# Patient Record
(demographics unavailable — no encounter records)

---

## 2025-01-14 NOTE — HISTORY OF PRESENT ILLNESS
[FreeTextEntry1] : MATIAS QUEZADA is a 70 year old right handed female with a PMH of hypertension, hyperlipidemia, who presents to the office today for neurosurgical follow up due to ICH.   She presented to [facility] on 12/29 with complaints of headache and right monocular blurry vision with history of 5 week episodic coughing prior.  She initially presented to the ED on 12/27 with headaches and was discharged home with NSAIDS, Zyrtec and nasal steroids. On 12/29 her symptoms progressed to right monocular blurry vision with non blood emesis after a coughing fit. Left occipital parenchymal hemorrhage. Left frontal parietal subarachnoid hemorrhage. MRI Brain demonstrated acute parenchymal hemorrhage in the left occipital lobe measuring up to 2.6 cm with mild surrounding vasogenic edema. Thin acute left cerebral convexity subdural hematoma measuring up to 3 mm maximum thickness and  Scattered small volumes acute subarachnoid hemorrhage over the bilateral cerebral convexities. No evidence of acute infarct. No midline shift. NIHSS 0 on arrival. She was placed on Keppra 500 mg every 12 hours for seizure prevention and discontinued on 1/1/25. She was started on 3% NS for goal sodium 135-145. She was transferred to [place] on 12/31 with negative diagnostic angiogram. On Verapamil 20 mg every 8 hours. She was discharged home on Verapamil and recommended for neurosurgical and neurology follow up with Dr. Ibrahima Beckwith. She underwent MRI Brain prior to her appointment today which demonstrated Since prior MRI brain 12/29/2024 continued evolution of 2.8 cm left occipital subacute hematoma without worsening mass effect. Stable adjacent subdural hematoma and interval decrease in subarachnoid hemorrhage. Evaluation for underlying lesion is limited due to intrinsic T1 hyperintensity due to evolving subacute hematoma (see detailed report below).  She notes mild frontal headaches aborted with Tylenol.  Surg Hx:    Meds:  Lexapro, Areds, cholestyramine, synthroid 88 mcg, budesonide, Verapamil 20 mg TID  Allergies: NKDA   Soc Hx: nonsmoker, social EtOH, lives with

## 2025-01-14 NOTE — ASSESSMENT
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   I have discussed the natural history and treatment options for intracranial hemorrhage with the patient. I explained the indications for observation and imaging surveillance, medical management, and surgery. I discussed the risks, benefits, possible complications and expected outcome related to each treatment option.  I explained the possible causes of intraparenchymal hemorrhage, which include chronic hypertension, cerebral amyloid angiopathy, anticoagulation/antiplatelet use (not applicable in her case), vascular malformations such as arteriovenous malformations (AVM) or arteriovenous fistulas (AVF), and less common causes like coagulopathies, vasculitis, or tumor-related hemorrhage. Her initial diagnostic cerebral angiogram performed by Dr. Joyce did not reveal any underlying vascular abnormalities as the cause of the hemorrhage. However, I discussed the small possibility that an AVM or AVF could remain undetected due to the mass effect and compression from the hematoma. She understands the potential risks associated with these conditions and is in agreement with the plan for follow-up imaging.  In the end, I recommend follow up with her vascular neurologist in [facility] Dr. Parada as scheduled. We will repeat CT head in March 2025 to follow the resolution of the ICH with phone visit to follow. If the CT head at that time reveals resolution of the ICH, we will proceed with a repeat diagnostic cerebral angiogram.  She should also see Dr. Kyrie Duenas for baseline visual field testing. I advised her to decrease her Tylenol usage and to continue to monitor her headaches. If she has any new or worsening neurological symptoms she should proceed to the ED for evaluation.   The patient understands the plan of care and is in agreement. All questions answered to patient satisfaction.  I spent 60 minutes relative to this patient encounter.

## 2025-01-14 NOTE — HISTORY OF PRESENT ILLNESS
[FreeTextEntry1] : MATIAS QUEZADA is a 70 year old right handed female with a PMH of hypertension, hyperlipidemia, who presents to the office today for neurosurgical follow up due to ICH.   She presented to [facility] on 12/29 with complaints of headache and right monocular blurry vision with history of 5 week episodic coughing prior.  She initially presented to the ED on 12/27 with headaches and was discharged home with NSAIDS, Zyrtec and nasal steroids. On 12/29 her symptoms progressed to right monocular blurry vision with non blood emesis after a coughing fit. Left occipital parenchymal hemorrhage. Left frontal parietal subarachnoid hemorrhage. MRI Brain demonstrated acute parenchymal hemorrhage in the left occipital lobe measuring up to 2.6 cm with mild surrounding vasogenic edema. Thin acute left cerebral convexity subdural hematoma measuring up to 3 mm maximum thickness and  Scattered small volumes acute subarachnoid hemorrhage over the bilateral cerebral convexities. No evidence of acute infarct. No midline shift. NIHSS 0 on arrival. She was placed on Keppra 500 mg every 12 hours for seizure prevention and discontinued on 1/1/25. She was started on 3% NS for goal sodium 135-145. She was transferred to [place] on 12/31 with negative diagnostic angiogram. On Verapamil 20 mg every 8 hours. She was discharged home on Verapamil and recommended for neurosurgical and neurology follow up with Dr. Ibrahima Beckwith. She underwent MRI Brain prior to her appointment today which demonstrated Since prior MRI brain 12/29/2024 continued evolution of 2.8 cm left occipital subacute hematoma without worsening mass effect. Stable adjacent subdural hematoma and interval decrease in subarachnoid hemorrhage. Evaluation for underlying lesion is limited due to intrinsic T1 hyperintensity due to evolving subacute hematoma (see detailed report below).  She notes mild frontal headaches aborted with Tylenol.  Surg Hx:    Meds:  Lexapro, Areds, cholestyramine, synthroid 88 mcg, budesonide, Verapamil 20 mg TID  Allergies: NKDA   Soc Hx: nonsmoker, social EtOH, lives with

## 2025-01-14 NOTE — ASSESSMENT
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   I have discussed the natural history and treatment options for intracranial hemorrhage with the patient. I explained the indications for observation and imaging surveillance, medical management, and surgery. I discussed the risks, benefits, possible complications and expected outcome related to each treatment option.  I explained the possible causes of intraparenchymal hemorrhage, which include chronic hypertension, cerebral amyloid angiopathy, anticoagulation/antiplatelet use (not applicable in her case), vascular malformations such as arteriovenous malformations (AVM) or arteriovenous fistulas (AVF), and less common causes like coagulopathies, vasculitis, or tumor-related hemorrhage. Her initial diagnostic cerebral angiogram performed by Dr. Joyce did not reveal any underlying vascular abnormalities as the cause of the hemorrhage. However, I discussed the small possibility that an AVM or AVF could remain undetected due to the mass effect and compression from the hematoma. She understands the potential risks associated with these conditions and is in agreement with the plan for follow-up imaging.  In the end, I recommend follow up with her vascular neurologist in [facility] Dr. Parada as scheduled. We will repeat CT head in March 2025 to follow the resolution of the ICH with phone visit to follow. If the CT head at that time reveals resolution of the ICH, we will proceed with a repeat diagnostic cerebral angiogram.  She should also see Dr. Kyrie Duenas for baseline visual field testing. I advised her to decrease her Tylenol usage and to continue to monitor her headaches. If she has any new or worsening neurological symptoms she should proceed to the ED for evaluation.   The patient understands the plan of care and is in agreement. All questions answered to patient satisfaction.  I spent 60 minutes relative to this patient encounter.